# Patient Record
Sex: MALE | Race: WHITE
[De-identification: names, ages, dates, MRNs, and addresses within clinical notes are randomized per-mention and may not be internally consistent; named-entity substitution may affect disease eponyms.]

---

## 2017-11-01 ENCOUNTER — HOSPITAL ENCOUNTER (EMERGENCY)
Dept: HOSPITAL 84 - D.ER | Age: 77
Discharge: HOME | End: 2017-11-01
Payer: MEDICARE

## 2017-11-01 VITALS — BODY MASS INDEX: 22.1 KG/M2

## 2017-11-01 DIAGNOSIS — G89.29: ICD-10-CM

## 2017-11-01 DIAGNOSIS — R53.1: Primary | ICD-10-CM

## 2018-10-30 ENCOUNTER — HOSPITAL ENCOUNTER (INPATIENT)
Dept: HOSPITAL 84 - D.ER | Age: 78
LOS: 4 days | Discharge: HOME | DRG: 392 | End: 2018-11-03
Attending: FAMILY MEDICINE | Admitting: FAMILY MEDICINE
Payer: MEDICARE

## 2018-10-30 VITALS — SYSTOLIC BLOOD PRESSURE: 158 MMHG | DIASTOLIC BLOOD PRESSURE: 66 MMHG

## 2018-10-30 VITALS
HEIGHT: 68 IN | WEIGHT: 155 LBS | WEIGHT: 155 LBS | BODY MASS INDEX: 23.49 KG/M2 | BODY MASS INDEX: 23.49 KG/M2 | HEIGHT: 68 IN | BODY MASS INDEX: 23.49 KG/M2

## 2018-10-30 VITALS — SYSTOLIC BLOOD PRESSURE: 148 MMHG | DIASTOLIC BLOOD PRESSURE: 72 MMHG

## 2018-10-30 DIAGNOSIS — I10: ICD-10-CM

## 2018-10-30 DIAGNOSIS — E86.0: ICD-10-CM

## 2018-10-30 DIAGNOSIS — M25.522: ICD-10-CM

## 2018-10-30 DIAGNOSIS — E83.51: ICD-10-CM

## 2018-10-30 DIAGNOSIS — F41.9: ICD-10-CM

## 2018-10-30 DIAGNOSIS — K57.32: Primary | ICD-10-CM

## 2018-10-30 DIAGNOSIS — I71.4: ICD-10-CM

## 2018-10-30 DIAGNOSIS — R31.9: ICD-10-CM

## 2018-10-30 LAB
ALBUMIN SERPL-MCNC: 3.9 G/DL (ref 3.4–5)
ALP SERPL-CCNC: 95 U/L (ref 46–116)
ALT SERPL-CCNC: 16 U/L (ref 10–68)
AMYLASE SERPL-CCNC: 46 U/L (ref 25–115)
ANION GAP SERPL CALC-SCNC: 14.7 MMOL/L (ref 8–16)
APPEARANCE UR: CLEAR
BACTERIA #/AREA URNS HPF: (no result) /HPF
BASOPHILS NFR BLD AUTO: 0.3 % (ref 0–2)
BILIRUB SERPL-MCNC: 0.92 MG/DL (ref 0.2–1.3)
BILIRUB SERPL-MCNC: NEGATIVE MG/DL
BUN SERPL-MCNC: 21 MG/DL (ref 7–18)
CALCIUM SERPL-MCNC: 9.3 MG/DL (ref 8.5–10.1)
CHLORIDE SERPL-SCNC: 103 MMOL/L (ref 98–107)
CO2 SERPL-SCNC: 27.9 MMOL/L (ref 21–32)
COLOR UR: YELLOW
CREAT SERPL-MCNC: 1.1 MG/DL (ref 0.6–1.3)
EOSINOPHIL NFR BLD: 0.3 % (ref 0–7)
ERYTHROCYTE [DISTWIDTH] IN BLOOD BY AUTOMATED COUNT: 13.6 % (ref 11.5–14.5)
GLOBULIN SER-MCNC: 3.6 G/L
GLUCOSE SERPL-MCNC: 120 MG/DL (ref 74–106)
GLUCOSE SERPL-MCNC: NEGATIVE MG/DL
HCT VFR BLD CALC: 41.7 % (ref 42–54)
HGB BLD-MCNC: 13.7 G/DL (ref 13.5–17.5)
IMM GRANULOCYTES NFR BLD: 0.1 % (ref 0–5)
KETONES UR STRIP-MCNC: (no result) MG/DL
LIPASE SERPL-CCNC: 111 U/L (ref 73–393)
LYMPHOCYTES NFR BLD AUTO: 28.6 % (ref 15–50)
MCH RBC QN AUTO: 31.1 PG (ref 26–34)
MCHC RBC AUTO-ENTMCNC: 32.9 G/DL (ref 31–37)
MCV RBC: 94.6 FL (ref 80–100)
MONOCYTES NFR BLD: 7.2 % (ref 2–11)
NEUTROPHILS NFR BLD AUTO: 63.5 % (ref 40–80)
NITRITE UR-MCNC: NEGATIVE MG/ML
OSMOLALITY SERPL CALC.SUM OF ELEC: 286 MOSM/KG (ref 275–300)
PH UR STRIP: 5 [PH] (ref 5–6)
PLATELET # BLD: 267 10X3/UL (ref 130–400)
PMV BLD AUTO: 11.1 FL (ref 7.4–10.4)
POTASSIUM SERPL-SCNC: 3.6 MMOL/L (ref 3.5–5.1)
PROT SERPL-MCNC: 7.5 G/DL (ref 6.4–8.2)
PROT UR-MCNC: (no result) MG/DL
RBC # BLD AUTO: 4.41 10X6/UL (ref 4.2–6.1)
RBC #/AREA URNS HPF: (no result) /HPF (ref 0–5)
SODIUM SERPL-SCNC: 142 MMOL/L (ref 136–145)
SP GR UR STRIP: 1.02 (ref 1–1.02)
TROPONIN I SERPL-MCNC: < 0.017 NG/ML (ref 0–0.06)
UROBILINOGEN UR-MCNC: NORMAL MG/DL
WBC # BLD AUTO: 8.9 10X3/UL (ref 4.8–10.8)
WBC #/AREA URNS HPF: (no result) /HPF (ref 0–5)

## 2018-10-31 VITALS — DIASTOLIC BLOOD PRESSURE: 72 MMHG | SYSTOLIC BLOOD PRESSURE: 157 MMHG

## 2018-10-31 VITALS — DIASTOLIC BLOOD PRESSURE: 56 MMHG | SYSTOLIC BLOOD PRESSURE: 97 MMHG

## 2018-10-31 VITALS — SYSTOLIC BLOOD PRESSURE: 106 MMHG | DIASTOLIC BLOOD PRESSURE: 67 MMHG

## 2018-10-31 VITALS — DIASTOLIC BLOOD PRESSURE: 71 MMHG | SYSTOLIC BLOOD PRESSURE: 153 MMHG

## 2018-10-31 VITALS — DIASTOLIC BLOOD PRESSURE: 61 MMHG | SYSTOLIC BLOOD PRESSURE: 144 MMHG

## 2018-10-31 LAB
ALBUMIN SERPL-MCNC: 3.1 G/DL (ref 3.4–5)
ALP SERPL-CCNC: 73 U/L (ref 46–116)
ALT SERPL-CCNC: 13 U/L (ref 10–68)
ANION GAP SERPL CALC-SCNC: 13.4 MMOL/L (ref 8–16)
BASOPHILS NFR BLD AUTO: 0.8 % (ref 0–2)
BILIRUB SERPL-MCNC: 0.81 MG/DL (ref 0.2–1.3)
BUN SERPL-MCNC: 15 MG/DL (ref 7–18)
CALCIUM SERPL-MCNC: 8.2 MG/DL (ref 8.5–10.1)
CHLORIDE SERPL-SCNC: 106 MMOL/L (ref 98–107)
CK MB SERPL-MCNC: 5.3 U/L (ref 0–3.6)
CK MB SERPL-MCNC: 5.4 U/L (ref 0–3.6)
CK SERPL-CCNC: 107 UL (ref 21–232)
CK SERPL-CCNC: 125 UL (ref 21–232)
CO2 SERPL-SCNC: 26.3 MMOL/L (ref 21–32)
CREAT SERPL-MCNC: 0.9 MG/DL (ref 0.6–1.3)
EOSINOPHIL NFR BLD: 0.8 % (ref 0–7)
ERYTHROCYTE [DISTWIDTH] IN BLOOD BY AUTOMATED COUNT: 13.7 % (ref 11.5–14.5)
GLOBULIN SER-MCNC: 3.7 G/L
GLUCOSE SERPL-MCNC: 92 MG/DL (ref 74–106)
HCT VFR BLD CALC: 38.1 % (ref 42–54)
HGB BLD-MCNC: 12.4 G/DL (ref 13.5–17.5)
IMM GRANULOCYTES NFR BLD: 0.1 % (ref 0–5)
LYMPHOCYTES NFR BLD AUTO: 26.5 % (ref 15–50)
MAGNESIUM SERPL-MCNC: 2 MG/DL (ref 1.8–2.4)
MCH RBC QN AUTO: 30.8 PG (ref 26–34)
MCHC RBC AUTO-ENTMCNC: 32.5 G/DL (ref 31–37)
MCV RBC: 94.8 FL (ref 80–100)
MONOCYTES NFR BLD: 7.4 % (ref 2–11)
NEUTROPHILS NFR BLD AUTO: 64.4 % (ref 40–80)
OSMOLALITY SERPL CALC.SUM OF ELEC: 283 MOSM/KG (ref 275–300)
PLATELET # BLD: 246 10X3/UL (ref 130–400)
PMV BLD AUTO: 11.4 FL (ref 7.4–10.4)
POTASSIUM SERPL-SCNC: 3.7 MMOL/L (ref 3.5–5.1)
PROT SERPL-MCNC: 6.8 G/DL (ref 6.4–8.2)
RBC # BLD AUTO: 4.02 10X6/UL (ref 4.2–6.1)
SODIUM SERPL-SCNC: 142 MMOL/L (ref 136–145)
TROPONIN I SERPL-MCNC: < 0.017 NG/ML (ref 0–0.06)
TROPONIN I SERPL-MCNC: < 0.017 NG/ML (ref 0–0.06)
WBC # BLD AUTO: 7.5 10X3/UL (ref 4.8–10.8)

## 2018-11-01 VITALS — SYSTOLIC BLOOD PRESSURE: 149 MMHG | DIASTOLIC BLOOD PRESSURE: 76 MMHG

## 2018-11-01 VITALS — DIASTOLIC BLOOD PRESSURE: 78 MMHG | SYSTOLIC BLOOD PRESSURE: 168 MMHG

## 2018-11-01 VITALS — SYSTOLIC BLOOD PRESSURE: 154 MMHG | DIASTOLIC BLOOD PRESSURE: 68 MMHG

## 2018-11-01 VITALS — DIASTOLIC BLOOD PRESSURE: 74 MMHG | SYSTOLIC BLOOD PRESSURE: 156 MMHG

## 2018-11-01 LAB
ANION GAP SERPL CALC-SCNC: 10.3 MMOL/L (ref 8–16)
BASOPHILS NFR BLD AUTO: 0.4 % (ref 0–2)
BUN SERPL-MCNC: 11 MG/DL (ref 7–18)
CALCIUM SERPL-MCNC: 8.3 MG/DL (ref 8.5–10.1)
CHLORIDE SERPL-SCNC: 106 MMOL/L (ref 98–107)
CK MB SERPL-MCNC: 4.4 U/L (ref 0–3.6)
CK SERPL-CCNC: 94 UL (ref 21–232)
CO2 SERPL-SCNC: 28.3 MMOL/L (ref 21–32)
CREAT SERPL-MCNC: 0.9 MG/DL (ref 0.6–1.3)
EOSINOPHIL NFR BLD: 0.4 % (ref 0–7)
ERYTHROCYTE [DISTWIDTH] IN BLOOD BY AUTOMATED COUNT: 13.3 % (ref 11.5–14.5)
GLUCOSE SERPL-MCNC: 94 MG/DL (ref 74–106)
HCT VFR BLD CALC: 38.2 % (ref 42–54)
HGB BLD-MCNC: 12.5 G/DL (ref 13.5–17.5)
IMM GRANULOCYTES NFR BLD: 0.1 % (ref 0–5)
LYMPHOCYTES NFR BLD AUTO: 28.8 % (ref 15–50)
MAGNESIUM SERPL-MCNC: 1.9 MG/DL (ref 1.8–2.4)
MCH RBC QN AUTO: 30.6 PG (ref 26–34)
MCHC RBC AUTO-ENTMCNC: 32.7 G/DL (ref 31–37)
MCV RBC: 93.4 FL (ref 80–100)
MONOCYTES NFR BLD: 7.8 % (ref 2–11)
NEUTROPHILS NFR BLD AUTO: 62.5 % (ref 40–80)
OSMOLALITY SERPL CALC.SUM OF ELEC: 279 MOSM/KG (ref 275–300)
PLATELET # BLD: 224 10X3/UL (ref 130–400)
PMV BLD AUTO: 10.9 FL (ref 7.4–10.4)
POTASSIUM SERPL-SCNC: 3.6 MMOL/L (ref 3.5–5.1)
RBC # BLD AUTO: 4.09 10X6/UL (ref 4.2–6.1)
SODIUM SERPL-SCNC: 141 MMOL/L (ref 136–145)
TROPONIN I SERPL-MCNC: < 0.017 NG/ML (ref 0–0.06)
WBC # BLD AUTO: 6.7 10X3/UL (ref 4.8–10.8)

## 2018-11-02 VITALS — DIASTOLIC BLOOD PRESSURE: 96 MMHG | SYSTOLIC BLOOD PRESSURE: 162 MMHG

## 2018-11-02 VITALS — DIASTOLIC BLOOD PRESSURE: 84 MMHG | SYSTOLIC BLOOD PRESSURE: 157 MMHG

## 2018-11-02 VITALS — DIASTOLIC BLOOD PRESSURE: 65 MMHG | SYSTOLIC BLOOD PRESSURE: 147 MMHG

## 2018-11-02 LAB
ALBUMIN SERPL-MCNC: 3.1 G/DL (ref 3.4–5)
ALP SERPL-CCNC: 69 U/L (ref 46–116)
ALT SERPL-CCNC: 14 U/L (ref 10–68)
ANION GAP SERPL CALC-SCNC: 12.7 MMOL/L (ref 8–16)
BASOPHILS NFR BLD AUTO: 0.7 % (ref 0–2)
BILIRUB SERPL-MCNC: 0.63 MG/DL (ref 0.2–1.3)
BUN SERPL-MCNC: 11 MG/DL (ref 7–18)
CALCIUM SERPL-MCNC: 8.4 MG/DL (ref 8.5–10.1)
CHLORIDE SERPL-SCNC: 107 MMOL/L (ref 98–107)
CO2 SERPL-SCNC: 26 MMOL/L (ref 21–32)
CREAT SERPL-MCNC: 0.9 MG/DL (ref 0.6–1.3)
EOSINOPHIL NFR BLD: 1.7 % (ref 0–7)
ERYTHROCYTE [DISTWIDTH] IN BLOOD BY AUTOMATED COUNT: 13.4 % (ref 11.5–14.5)
GLOBULIN SER-MCNC: 3 G/L
GLUCOSE SERPL-MCNC: 98 MG/DL (ref 74–106)
HCT VFR BLD CALC: 36.7 % (ref 42–54)
HGB BLD-MCNC: 12.2 G/DL (ref 13.5–17.5)
IMM GRANULOCYTES NFR BLD: 0.2 % (ref 0–5)
LYMPHOCYTES NFR BLD AUTO: 36.1 % (ref 15–50)
MAGNESIUM SERPL-MCNC: 1.8 MG/DL (ref 1.8–2.4)
MCH RBC QN AUTO: 31 PG (ref 26–34)
MCHC RBC AUTO-ENTMCNC: 33.2 G/DL (ref 31–37)
MCV RBC: 93.4 FL (ref 80–100)
MONOCYTES NFR BLD: 10.3 % (ref 2–11)
NEUTROPHILS NFR BLD AUTO: 51 % (ref 40–80)
OSMOLALITY SERPL CALC.SUM OF ELEC: 281 MOSM/KG (ref 275–300)
PLATELET # BLD: 226 10X3/UL (ref 130–400)
PMV BLD AUTO: 11 FL (ref 7.4–10.4)
POTASSIUM SERPL-SCNC: 3.7 MMOL/L (ref 3.5–5.1)
PROT SERPL-MCNC: 6.1 G/DL (ref 6.4–8.2)
RBC # BLD AUTO: 3.93 10X6/UL (ref 4.2–6.1)
SODIUM SERPL-SCNC: 142 MMOL/L (ref 136–145)
WBC # BLD AUTO: 5 10X3/UL (ref 4.8–10.8)

## 2018-11-03 VITALS — SYSTOLIC BLOOD PRESSURE: 179 MMHG | DIASTOLIC BLOOD PRESSURE: 92 MMHG

## 2018-11-03 VITALS — DIASTOLIC BLOOD PRESSURE: 72 MMHG | SYSTOLIC BLOOD PRESSURE: 145 MMHG

## 2018-11-03 VITALS — DIASTOLIC BLOOD PRESSURE: 79 MMHG | SYSTOLIC BLOOD PRESSURE: 162 MMHG

## 2018-11-03 LAB
ALBUMIN SERPL-MCNC: 3.1 G/DL (ref 3.4–5)
ALP SERPL-CCNC: 64 U/L (ref 46–116)
ALT SERPL-CCNC: 15 U/L (ref 10–68)
ANION GAP SERPL CALC-SCNC: 14.9 MMOL/L (ref 8–16)
BASOPHILS NFR BLD AUTO: 0.8 % (ref 0–2)
BILIRUB SERPL-MCNC: 0.4 MG/DL (ref 0.2–1.3)
BUN SERPL-MCNC: 12 MG/DL (ref 7–18)
CALCIUM SERPL-MCNC: 8.8 MG/DL (ref 8.5–10.1)
CHLORIDE SERPL-SCNC: 107 MMOL/L (ref 98–107)
CO2 SERPL-SCNC: 24.3 MMOL/L (ref 21–32)
CREAT SERPL-MCNC: 0.8 MG/DL (ref 0.6–1.3)
EOSINOPHIL NFR BLD: 1.8 % (ref 0–7)
ERYTHROCYTE [DISTWIDTH] IN BLOOD BY AUTOMATED COUNT: 13.1 % (ref 11.5–14.5)
GLOBULIN SER-MCNC: 3.4 G/L
GLUCOSE SERPL-MCNC: 112 MG/DL (ref 74–106)
HCT VFR BLD CALC: 37.1 % (ref 42–54)
HGB BLD-MCNC: 12.3 G/DL (ref 13.5–17.5)
IMM GRANULOCYTES NFR BLD: 0 % (ref 0–5)
LYMPHOCYTES NFR BLD AUTO: 33.2 % (ref 15–50)
MAGNESIUM SERPL-MCNC: 2 MG/DL (ref 1.8–2.4)
MCH RBC QN AUTO: 30.5 PG (ref 26–34)
MCHC RBC AUTO-ENTMCNC: 33.2 G/DL (ref 31–37)
MCV RBC: 92.1 FL (ref 80–100)
MONOCYTES NFR BLD: 10.4 % (ref 2–11)
NEUTROPHILS NFR BLD AUTO: 53.8 % (ref 40–80)
OSMOLALITY SERPL CALC.SUM OF ELEC: 285 MOSM/KG (ref 275–300)
PLATELET # BLD: 257 10X3/UL (ref 130–400)
PMV BLD AUTO: 11.3 FL (ref 7.4–10.4)
POTASSIUM SERPL-SCNC: 3.2 MMOL/L (ref 3.5–5.1)
PROT SERPL-MCNC: 6.5 G/DL (ref 6.4–8.2)
RBC # BLD AUTO: 4.03 10X6/UL (ref 4.2–6.1)
SODIUM SERPL-SCNC: 143 MMOL/L (ref 136–145)
WBC # BLD AUTO: 6.2 10X3/UL (ref 4.8–10.8)

## 2019-08-07 ENCOUNTER — HOSPITAL ENCOUNTER (OUTPATIENT)
Dept: HOSPITAL 84 - D.ER | Age: 79
Setting detail: OBSERVATION
LOS: 1 days | Discharge: HOME | End: 2019-08-08
Attending: INTERNAL MEDICINE | Admitting: INTERNAL MEDICINE
Payer: MEDICARE

## 2019-08-07 VITALS — DIASTOLIC BLOOD PRESSURE: 72 MMHG | SYSTOLIC BLOOD PRESSURE: 128 MMHG

## 2019-08-07 VITALS — BODY MASS INDEX: 25.82 KG/M2 | HEIGHT: 68 IN | WEIGHT: 170.36 LBS

## 2019-08-07 VITALS — DIASTOLIC BLOOD PRESSURE: 74 MMHG | SYSTOLIC BLOOD PRESSURE: 127 MMHG

## 2019-08-07 VITALS — SYSTOLIC BLOOD PRESSURE: 129 MMHG | DIASTOLIC BLOOD PRESSURE: 72 MMHG

## 2019-08-07 VITALS — DIASTOLIC BLOOD PRESSURE: 76 MMHG | SYSTOLIC BLOOD PRESSURE: 118 MMHG

## 2019-08-07 DIAGNOSIS — I25.110: Primary | ICD-10-CM

## 2019-08-07 DIAGNOSIS — I10: ICD-10-CM

## 2019-08-07 LAB
ALBUMIN SERPL-MCNC: 3.9 G/DL (ref 3.4–5)
ALP SERPL-CCNC: 95 U/L (ref 46–116)
ALT SERPL-CCNC: 16 U/L (ref 10–68)
ANION GAP SERPL CALC-SCNC: 13.8 MMOL/L (ref 8–16)
APTT BLD: 29.1 SECONDS (ref 22.8–39.4)
BASOPHILS NFR BLD AUTO: 0.4 % (ref 0–2)
BILIRUB SERPL-MCNC: 0.74 MG/DL (ref 0.2–1.3)
BUN SERPL-MCNC: 17 MG/DL (ref 7–18)
CALCIUM SERPL-MCNC: 8.8 MG/DL (ref 8.5–10.1)
CHLORIDE SERPL-SCNC: 105 MMOL/L (ref 98–107)
CK MB SERPL-MCNC: 0.5 U/L (ref 0–3.6)
CK MB SERPL-MCNC: 0.6 U/L (ref 0–3.6)
CK MB SERPL-MCNC: 0.6 U/L (ref 0–3.6)
CK SERPL-CCNC: 26 UL (ref 21–232)
CK SERPL-CCNC: 28 UL (ref 21–232)
CK SERPL-CCNC: 43 UL (ref 21–232)
CO2 SERPL-SCNC: 25.4 MMOL/L (ref 21–32)
CREAT SERPL-MCNC: 1.2 MG/DL (ref 0.6–1.3)
EOSINOPHIL NFR BLD: 1.1 % (ref 0–7)
ERYTHROCYTE [DISTWIDTH] IN BLOOD BY AUTOMATED COUNT: 13.6 % (ref 11.5–14.5)
GLOBULIN SER-MCNC: 3.9 G/L
GLUCOSE SERPL-MCNC: 150 MG/DL (ref 74–106)
HCT VFR BLD CALC: 40.5 % (ref 42–54)
HGB BLD-MCNC: 13.6 G/DL (ref 13.5–17.5)
IMM GRANULOCYTES NFR BLD: 0.1 % (ref 0–5)
INR PPP: 1.04 (ref 0.85–1.17)
LYMPHOCYTES NFR BLD AUTO: 23.6 % (ref 15–50)
MAGNESIUM SERPL-MCNC: 2.3 MG/DL (ref 1.8–2.4)
MCH RBC QN AUTO: 31.2 PG (ref 26–34)
MCHC RBC AUTO-ENTMCNC: 33.6 G/DL (ref 31–37)
MCV RBC: 92.9 FL (ref 80–100)
MONOCYTES NFR BLD: 6.3 % (ref 2–11)
NEUTROPHILS NFR BLD AUTO: 68.5 % (ref 40–80)
OSMOLALITY SERPL CALC.SUM OF ELEC: 283 MOSM/KG (ref 275–300)
PLATELET # BLD: 306 10X3/UL (ref 130–400)
PMV BLD AUTO: 11.2 FL (ref 7.4–10.4)
POTASSIUM SERPL-SCNC: 4.2 MMOL/L (ref 3.5–5.1)
PROT SERPL-MCNC: 7.8 G/DL (ref 6.4–8.2)
PROTHROMBIN TIME: 13.1 SECONDS (ref 11.6–15)
RBC # BLD AUTO: 4.36 10X6/UL (ref 4.2–6.1)
SODIUM SERPL-SCNC: 140 MMOL/L (ref 136–145)
TROPONIN I SERPL-MCNC: < 0.017 NG/ML (ref 0–0.06)
WBC # BLD AUTO: 9.5 10X3/UL (ref 4.8–10.8)

## 2019-08-07 NOTE — NUR
REPORT RECEIVED, WILL CPOC. PATIENT IS A&OX4, UP AD EDILBERTO. HE'S LYING ON RT
SIDE, EYES CLOSED. PATIENT WILL HAVE CATH IN AM, INFORMED PATIENT HE'D BE NPO
AT MIDNIGHT. NO S/SX OF DISTRESS NOTED, RR EVEN AND UNLABORED, SR ON MONITORS.
IV TO RT AC VÍCTOR, LAY C/D/I, PATENT. PATIENT DENIES FURTHER NEEDS. CL IN REACH,
BED LOCKED AND LOWERED. WILL CTM.

## 2019-08-07 NOTE — NUR
PATIENT REQUESTED PAIN MEDS, CALLED DR. MILLS. ORDER FOR ONE TIME 5MG NORCO
PUT IN. ADMINISTERED MED TO PATIENT. WILL CTM.

## 2019-08-07 NOTE — HEMODYNAMI
PATIENT:NOEMI AUGUSTIN                                     MEDICAL RECORD: [id number]
: 42                                            LOCATION:[facility]     D.2122
[facility]# [id number]                                       ADMISSION DATE: 19
 
 
 Generatedon:201911:13
Patient name: NOEMI AUGUSTIN Patient #: [id number] Visit #: [id number] SSN: [id number]
 :
1940 Date of study: 2019
Page: Of
Hemodynamic Procedure Report
****************************
Patient Data
Patient Demographics
Procedure consent was obtained
First Name: NOEMI           Gender: Male
Last Name: CHARLI           : 1940
Patient #: [id number]       Age: 79 year(s)
Visit #: [id number]       Race: 
SSN: 
Accession #:
[id number]
Additional ID: [id number]
Contact details
Address: [address] [place] [address]      Phone: [phone]
State: [place]
City: [place]
Zip code: [address]
Past Medical History
Allergies: No known allergies
Admission
Admission Data
Admission Date: 2019    Admission Time: 16:09
Arrival Date: 2019      Arrival Time: 0:00
Admit Source: Other         Insurance Payor: Private
Room #: D.2122              health insurance
[facility] #: [id number]
Height (in.): 67.72         BSA: 1.85 (m2)
Height (cm.): 172           BMI: 24.34 (kg/m2)
Weight (lbs.): 158.73
Weight (kg.): 72
Procedure
Procedure Types
Cath Procedure
Diagnostic Procedure
C
[facility] w/Coronaries
Procedure Description
Procedure Date
Procedure Date: 2019
Procedure Start Time: 10:58
Procedure End Time: 11:13
Procedure Staff
Name                            Function
Star Valladares MD                   Performing Physician
Shyam Rome RT                  Monitor
Nereyda Marrero RT                Scrub
Micky Lazcano RT                  Scrub
Leigha Quezada RN                  Nurse
 
Procedure Data
Cath Procedure
Fluoroscopy
Diagnostic fluoroscopy      Total fluoroscopy Time: 2.2
time: 2.2 min               min
Diagnostic fluoroscopy      Total fluoroscopy dose: 313
dose: 313 mGy               mGy
Contrast Material
Contrast Material Type                       Amount (ml)
Isovue 300                                   78
Entry Location
Entry     Primary  Successful  Side  Size  Upsize Upsize Entry    Closure Succes
sful  Closure
Location                             (Fr)  1 (Fr) 2 (Fr) Remarks  Device        
      Remarks
Femoral                        Right 5 Fr                         Exoseal
artery
Estimated blood loss: 5 ml
Diagnostic catheters
Device Type               Used For           End Catheter
Placement
MULTIPACK JL 4.0 5Fr      Procedure
catheter
MULTIPACK 3DRC 5Fr        Procedure
catheter
MULTIPACK Pigtail 5 Fr    Procedure
catheter
Procedure Complications
No complications
Procedure Medications
Medication           Administration Route Dosage
Oxygen               etCO2 Nasal cannula  2 l/min
Lidocaine 2%         added to field       20
Heparin Flush Bag    added to field       2 bags
(1000units/500ml NS)
0.9% NaCl            I.V.                 100 ml/hr
Versed               I.V.                 2 mg
Fentanyl             I.V.                 50 mcg
Versed               I.V.                 1 mg
Fentanyl             I.V.                 50 mcg
Versed               I.V.                 1 mg
Hemodynamics
Rest
BSA: 1.85 (m2) O2 Consumption: Estimated: 212.83 (ml/min) O2 Consumption indexed
:
Estimated:115.04 (ml/min/m) Heart Rate: 71 (bpm)
Pressure Samples
Time     Site     Value (mmHg) Purpose      Heart      Use
Rate(bpm)
11:08    LV       102/-8,4     Snapshot     71
11:08    AO       104/50(73)   Pullback     71
11:08    LV       96/-4,12     Pullback     71
Gradients
Valve  Time  Site 1   Site 2     Mean    SEP/DFP    Peak To Heart  Use
(mmHg)  (sec/min)  Peak    Rate
(mmHg)  (bpm)
Aortic 11:08 LV       AO         0       4          0       71
96/-4,12 104/50(73)
Calculations
Valve    P-P      Mean      Valve     Index  Valve    Source
 
Name              Gradient  Area             Flow
(cm2)
Aortic   0        0
0        0
Snapshots
Pre Cath      Intra         NCS           Post Cath
Vital Signs
Time     Heart  Resp   SPO2 etCO2   NIBP (mmHg) Rhythm  Pain      Sedation
Rate   (ipm)  (%)  (mmHg)                      Status    Level
(bpm)
10:43:24 74     18     98   0       138/81(112) NSR     3 (11) ,  10(A)
Tolerable
10:47:36 73     13     95   32.2    117/77(104) NSR     3 (11) ,  10(A)
Tolerable
10:51:42 73     17     96   34.4    126/72(110) NSR     3 (11) ,  10(A)
Tolerable
10:55:52 71     15     95   16.4    116/73(99)  NSR     0 (11) ,  9(A)
No pain
10:59:59 70     15     97   33.7    105/67(81)  NSR     0 (11) ,  9(A)
No pain
11:04:03 73     13     97   14.9    115/68(88)  NSR     0 (11) ,  9(A)
No pain
11:08:13 74     17     97   35.9    95/60(82)   NSR     0 (11) ,  9(A)
No pain
11:13:00 70     14     97   37.4    119/69(100) NSR     0 (11) ,  10(A)
No pain
Medications
Time     Medication       Route   Dose  Verified Delivered Reason     Notes  Eff
ectiveness
by       by
10:46:38 Oxygen           etCO2   2     Star     Buffie    used for
Nasal   l/min Blaine Quzeada RN   procedure
cannula
10:46:45 Lidocaine 2%     added   20ml  Star     Star      for local
to      vial  Blaine Valladares MD  anesthetic
field
10:46:51 Heparin Flush    added   2     Star     Star      used for
Bag              to      bags  Blaine Valladares MD  procedure
(1000units/500ml field
NS)
10:46:59 0.9% NaCl        I.V.    100   Star     Buffie    Per
ml/hr Blaine Quezada RN   physician
10:52:31 Versed           I.V.    2 mg  Star     Buffie    for
Blaine Quezada RN   sedation
10:52:37 Fentanyl         I.V.    50    Star     Buffie    for
mcg   Blaine Quezada RN   sedation
10:58:20 Versed           I.V.    1 mg  Star     Buffie    for
Blaine Quezada RN   sedation
10:58:24 Fentanyl         I.V.    50    Star     Buffie    for
mcg   Blaine Quezada RN   sedation
11:02:27 Versed           I.V.    1 mg  Star     Buffie    for
Blaine Quezada RN   sedation
Procedure Log
Time     Note
9:59:48  Informed consent obtained and on chart
10:00:17 Insurance Payor : Private health insurance
10:00:34 Admit Source: Other
10:00:36 Arrival Date: 2019 12:00:00 AM
10:00:50 Patient Weight : 158.73 lbs
10:00:53 Patient Height : 67.72 inches
 
10:01:26 Lab Result : BUN 17 mg/dl
10:01:26 Lab Result : Hematocrit 40.5 %
10:01:26 Lab Result : Hemoglobin 13.6 g/dl
10:01:26 Lab Result : Creatinine 1.2 mg/dl
10:01:46 Diagnostic Cath Status : Elective
10:05:08 Patient allergic to No known allergies
10:06:14 **ACC** Patient presents with Unstable Angina CCS
Anginal Class 4--Inability to carry out any physical
activity w/o angina. Angina may occur at rest.
10:06:17 **ACC**Patient has been prescribed/administered the
following anti-anginal medication within the last 2
weeks: Calcium Channel Blockers
10:06:20 Procedure Status Urgent Heart Cath (IP).
10:06:22 Time tracking: Regular hours (M-F 7:00 - 5:00)
10:06:25 Plan of Care:Hemodynamics will remain stable., Cardiac
rhythm will remain stable., Comfort level will be
maintained., Respiratory function will remain
adequate., Patient/ family verbilizes understanding of
procedure., Procedure tolerated without complication.,
Recovers from procedure without complications..
10:06:36 H&P Date Dictated: 2019 Within 30 days and on
chart..
10:25:34 Shyam Rome RT(R) sent for patient. Start room use.
10:35:45 Patient received from Aditive II to CCL 1 Alert and
oriented. Tansferred to table in Supine position.
10:35:47 Warm blankets applied, and sandor hugger turned on for
patient comfort.
10:35:48 Correct patient and procedure confirmed by team.
10:35:48 ECG and BP/O2 sat monitors applied to patient.
10:35:50 Pre-procedure instructions explained to patient.
10:35:53 Pre-op teaching completed and patient verbalized
understanding.
10:35:57 Family unavailable.
10:35:57 Patient NPO since Midnight.
10:35:59 Is the patient allergic to Iodine/contrast media? No.
10:36:00 Is patient on blood thinner?No
10:36:01 Patient diabetic? No.
10:36:03 Previous problem with sedation/anesthesia? No ?
10:36:05 Snore? No
10:36:06 Sleep apnea? No
10:36:07 Deviated septum? No
10:36:08 Opens mouth fully? Yes
10:36:09 Sticks out tongue? Yes
10:36:10 Airway obstruction? No ?
10:36:12 Dentures? No ?
10:41:51 Pre procedure: right dorsailis pedis pulse 1+
Palpable, but thready & weak; easily obliterated
10:41:56 Patient pain scale 3/10 chest .
10:42:04 IV patent on arrival in right forearm with 0.9% NaCl
at [facility].
10:42:05 Lab results completed and on chart.
10:42:08 Right groin area was prepped with chlora-prep and
draped in sterile fashion
10:42:09 Alarms reviewed by R. N.
10:42:09 Sharps counted by scrub and verified by R.N.
10:42:11 Use device set Femoral Dx
10:42:12 ACIST Syringe (85670) opened to sterile field.
10:42:12 Bag Decanter (2002S) opened to sterile field.
10:42:13 Medline Cath Pack ([id number]) opened to sterile field.
10:42:13 ACIST Hand Control (18021) opened to sterile field.
 
10:42:14 ACIST Manifold (45394) opened to sterile field.
10:42:15 Tegaderm 4 x 4 (1626W) opened to sterile field.
10:42:15 DIAGNOSTIC Multipack 5Fr catheter set ([id number]) opened
to sterile field.
10:42:16 EMERALD Guide Wire (672-208) opened to sterile field.
10:42:17 SHEATH 5FR Matlock (IBW263) opened to sterile field.
10:42:23 Vital chart was started
10:42:24 Baseline sample Acquired.
10:42:26 Rhythm: sinus rhythm
10:42:27 Full Disclosure recording started
10:46:38 Oxygen 2 l/min etCO2 Nasal cannula was administered by
Leigha Quezada RN; used for procedure;
10:46:45 Lidocaine 2% 20ml vial added to field was administered
by Star Valladares MD; for local anesthetic;
10:46:51 Heparin Flush Bag (1000units/500ml NS) 2 bags added to
field was administered by Star Valladares MD; used for
procedure;
10:46:59 0.9% NaCl 100 ml/hr I.V. was administered by Leigha Quezada RN; Per physician;
10:49:22 Physician arrived
10:49:22 --------ALL STOP TIME OUT------
10:49:23 Final Timeout: patient, procedure, and site verified
with staff and physician. All members of the team are
in agreement.
10:49:26 Right groin site verified by team.
10:49:29 Fire Safety Assessment: A--An alcohol-based skin
anteseptic being used preoperatively., C--Open oxygen
or nitrous oxide is being used., D--An ESU, laser, or
fiber-optic light is being used.
10:49:32 Physical assessment completed. ASA score P 2 - A
patient with mild systemic disease as per Star Valladares MD.
10:51:41 2) 60-89 Mildly reduced kidney function, and other
findings (as for stage 1) point to kidney disease.
10:51:45 Maximum allowable contrast dose (3.7 X eGFR X 0.75)172
ml.
10:51:48 Sedation plan: IV Moderate Sedation Medication:Versed,
Fentanyl
10:52:31 Versed 2 mg I.V. was administered by Leigha Quezada RN;
for sedation;
10:52:37 Fentanyl 50 mcg I.V. was administered by Leigha Quezada
RN; for sedation;
10:54:45 Zero performed for pressure channel P1
10:58:20 Versed 1 mg I.V. was administered by Leigha Quezada RN;
for sedation;
10:58:24 Procedure started.
10:58:24 Fentanyl 50 mcg I.V. was administered by Leigha Quezada
RN; for sedation;
10:58:27 Local anesthetic to right femoral artery with
Lidocaine 2% by Star Valladares MD.**INITIAL ACCESS ONLY**
10:58:34 A 5 Fr sheath was inserted into the Right Femoral
artery
11:00:02 A MULTIPACK JL 4.0 5Fr catheter was advanced over the
wire and used for Procedure.
11:00:46 LCA angiography performed.
11:02:11 Catheter exchanged over wire.
11:02:16 A MULTIPACK 3DRC 5Fr catheter was advanced over the
wire and used for Procedure.
11:02:27 Versed 1 mg I.V. was administered by Leigha Quezada RN;
for sedation;
 
11:04:06 RCA angiography performed.
11:05:29 Catheter exchanged over wire.
11:05:34 A MULTIPACK Pigtail 5 Fr catheter was advanced over
the wire and used for Procedure.
11:08:09 LV gram done using MCKEON
11:08:12 Injector settings: Ml/sec: 10, Volume: 20,
11:08:13 LV hemodynamics recorded.
11:08:24 EF : 60 %
11:08:38 Catheter removed.
11:08:39 EXOSEAL 5Fr () opened to sterile field.
11:09:16 Sheath removed intact; hemostasis achieved with
Exoseal to the Right Femoral artery.
11:11:27 Procedure ended.(Physican Out)
11:11:37 Fluoroscopy time 02.20 minutes.
11:11:41 Flurop Dose total: 313
11:11:41 Fluoroscopy dose: 313 mGy
11:11:48 Dose Area Product 80461 mGy/cm.
11:12:03 Contrast amount:Isovue 300 78ml.
11:12:06 Maximum allowable dose exceeded? No.
11:12:20 Sharps counted by scrub and verified by R.N.
11:12:21 Insertion/operative site no bleeding no hematoma.
11:12:23 Post-op/insertion site Right Femoral artery dressed
using a 4 x 4 and Tegaderm.
11:12:26 Post right femoral artery:stable, soft, clean and dry
11:12:27 Post Procedure Pulses reassessed and unchanged
11:12:29 Post-procedure physical assessment completed. ASA
score P 2 - A patient with mild systemic disease as
per Star Valladares MD.
11:12:31 Post procedure rhythm: unchanged.
11:12:33 Estimated blood loss: 5 ml
11:12:34 Post procedure instruction explained to
patient.Patient verbalizes understanding.
11:12:35 Patient needs reinforcement of post procedure
teaching.
11:13:09 Procedure and supply charges have been captured,
reviewed, submitted and are correct.
11:13:11 Procedure Complication : No complications
11:13:13 Vital chart was stopped
11:13:14 See physician's report for complete and final results.
11:13:15 Report given to PCU.
11:13:18 Patient transfered to PCU with Stretcher.
11:13:20 Procedure ended.
11:13:20 Full Disclosure recording stopped
11:13:26 End room use (Document Last)
Device Usage
Item Name   Manufacture  Quantity  Catalog    Hospital Part    Current Minimal L
ot# /
Number     Charge   Number  Stock   Stock   Serial#
Code
ACIST       Acist        1         96690      102098   200588  205788  20
[employer]ari Medical
(79145)     Systems Inc
Bag         Microtek     1               430093   52567   370525  5
Decanter    Medical Inc.
(2002S)
Medline     Medline      1         [id number]  153155   75333   582127  5
Cath Pack
([id number])
ACIST Hand  Acist        1         82586      176929   606483  935963  5
Control     Medical
 
(92279)     Systems Inc
ACIST       Acist        1         22032      694098   221787  173212  5
Manifold    Medical
(11187)     Systems Inc
Tegaderm 4  3M           1         1626W      548246   717575  375312  5
x 4 (1626W)
DIAGNOSTIC  Cardinal     1         XJ5433     676077   10381   829401  30
Multipack   Health
5Fr
catheter
set
(YF0293)
EMERALD     Cardinal     1         780-617    366552   979009  383429  5
Guide Wire  Health
(502-390)
SHEATH 5FR  Terumo       1         HPU577     837275   586925  286519  5
Matlock
(PGR586)
MULTIPACK   Cardinal     1                                     862326  5
JL 4.0 5Fr  Health
catheter
MULTIPACK   Cardinal     1                                     749054  5
3DRC 5Fr    Health
catheter
MULTIPACK   Cardinal     1                                     771888  5
Pigtail 5   Health
Fr catheter
EXOSEAL 5Fr Cardinal     1               158087   407097  121101  10
()     Health
Signature Audit Kent
Stage           Time        Signature      Unsigned
Intra-Procedure 2019    Shyam Rome
11:13:53 AM RT(R)
Signatures
Performing Physician :  Signature :
Star Valladares MD           ______________________________
Date : ______________ Time :
______________
Monitor : Shyam Rome RT Signature :
______________________________
Date : ______________ Time :
______________
Nurse : Leigha Quezada RN   Signature :
______________________________
Date : ______________ Time :
______________
 
 
 
 
 
 
 
 
 
[facility]                                          
[address]                           
[place] [address]

## 2019-08-08 VITALS — DIASTOLIC BLOOD PRESSURE: 59 MMHG | SYSTOLIC BLOOD PRESSURE: 117 MMHG

## 2019-08-08 VITALS — SYSTOLIC BLOOD PRESSURE: 125 MMHG | DIASTOLIC BLOOD PRESSURE: 66 MMHG

## 2019-08-08 VITALS — DIASTOLIC BLOOD PRESSURE: 52 MMHG | SYSTOLIC BLOOD PRESSURE: 113 MMHG

## 2019-08-08 LAB
CK MB SERPL-MCNC: 0.6 U/L (ref 0–3.6)
CK SERPL-CCNC: 25 UL (ref 21–232)
TROPONIN I SERPL-MCNC: < 0.017 NG/ML (ref 0–0.06)

## 2019-08-08 NOTE — MORECARE
CASE MANAGEMENT DISCHARGE SUMMARY
 
 
PATIENT: NOEMI AUGUSTIN                        UNIT: [id number]
ACCOUNT#: [id number]                       ADM DATE: 19
AGE: 79     : [birth year]  SEX: M            ROOM/BED: D.2122    
AUTHOR: YADIRA ROCHE                             PHYSICIAN:                               
 
REFERRING PHYSICIAN: TYLER AYERS M.D.          
DATE OF SERVICE: 19
Discharge Plan
 
 
Patient Name: NOEMI AUGUSTIN
Facility: [facility]:[place]
Encounter #: [id number]
Medical Record #: [id number]
: 1940
Planned Disposition: Home
Anticipated Discharge Date: 19
 
Discharge Date: 
Expected LOS: 1
Initial Reviewer: [email or web address]
Initial Review Date: 2019
Generated: 19   4:03 pm 
 DCPIA - Discharge Planning Initial Assessment
 
Updated by [email or web address]: Gerald Patterson on 19   3:00 pm
*  Is the patient Alert and Oriented?
Yes
*  How many steps to enter\exit or inside your home?
 
*  PCP
DR. ALFORD
*  Pharmacy
HOME [facility]
*  Preadmission Environment
Home Alone
*  ADLs
Independent
*  Equipment
None
*  Other Equipment
NO MEDICAL EQUIPMENT PROVIDER PREFERENCE
*  List name and contact numbers for known caregivers / representatives who 
currently or will assist patient after discharge:
NONE
*  Verbal permission to speak to the caregivers and representatives has been 
obtained from the patient.
 
N/A
*  Community resources currently utilized
None
*  Please name any agencies selected above.
NONE
*  Additional services required to return to the preadmission environment?
No
*  Can the patient safely return to the preadmission environment?
Yes
*  Has this patient been hospitalized within the prior 30 days at any 
hospital?
No
 
 
 
 
 
 
Patient Name: NOEMI AUGUSTIN
Encounter #: [id number]
Page 10063
 
 
 
 
 
Electronically Signed by YADIRA ROCHE on 19 at 1503
 
 
 
 
 
 
**All edits/amendments must be made on the electronic document**
 
DICTATION DATE: 19 1503     : JARRELL  19 1503     
RPT#: [id number]                                DC DATE:        
                                               STATUS: ADM IN  
[facility]
 [place] [address]
***END OF REPORT***

## 2019-08-08 NOTE — NUR
REPORT RECEIVED. WILL CONTINUE WITH POC. PT CURRENTLY LYING ON RIGHT SIDE
RESTING. CALL LIGHT W/I REACH. PT IS AAO AND UP AD EDILBERTO. RR EVEN AND UNLABORED
ON RA. PIV IS SALINE LOCKED. PT IS NPO FOR HEART CATH. PT DENIES ANY NEEDS AT
THIS TIME. NO S/S OF DISTRESS NOTED. WILL CTM.

## 2019-08-08 NOTE — NUR
PREOP MEDS ADMINISTERED PER CATH LAB REQUEST. NS INFUSING @50ML/HR VIA R.AC
PIV. PT DENIES ANY NEEDS. WILL CTM.

## 2019-08-08 NOTE — NUR
PT RETURNED FROM CATH LAB. RIGHT FEMORAL CATH SITE IS C/D/I WITH NO S/S OF
HEMATOMA PRESENT. NO S/S OF DISTRESS NOTED. PT DENIES ANY NEEDS. WILL CTM.

## 2019-08-08 NOTE — MORECARE
CASE MANAGEMENT DISCHARGE SUMMARY
 
 
PATIENT: NOEMI AUGUSTIN                        UNIT: [id number]
ACCOUNT#: [id number]                       ADM DATE: 19
AGE: 79     : 42  SEX: M            ROOM/BED: D.0322    
AUTHOR: MELCHOR,DOC                             PHYSICIAN:                               
 
REFERRING PHYSICIAN: TYLER AYERS M.D.          
DATE OF SERVICE: 19
Discharge Plan
 
 
Patient Name: NOEMI AUGUSTIN
Facility: [facility]:[place]
Encounter #: [id number]
Medical Record #: [id number]
: 1940
Planned Disposition: Home
Anticipated Discharge Date: 19
 
Discharge Date: 2019
Expected LOS: 1
Initial Reviewer: [email or web address]
Initial Review Date: 2019
Generated: 19   4:12 pm 
Comments
 
DCP- Discharge Planning
 
Updated by [id number]: Gerald Patterson on 19   2:06 pm CT
Patient Name: NOEMI AUGUSTIN                                     
Admission Status: ER   
Accout number: [id number]                              
Admission Date: 2019   
: 1940                                                        
Admission Diagnosis:   
Attending: TYLER AYERS                                                
Current LOS:  1   
  
Anticipated DC Date: 2019   
Planned Disposition: Home   
Primary Insurance: MEDICARE A & B   
  
  
Discharge Planning Comments:   
CM SPOKE TO PT'S BEDSIDE NURSE WHO INFORMED CM THAT PT DROVE HIMSELF TO 
EMERGENCY ROOM, CANNOT DRIVE FOR 24 HOURS AND HAS NO RIDE HOME.  CM MET WITH 
PT IN ROOM TO DISCUSS DISCHARGE PLANNING AND NEEDS. PT REPORTS LIVING AT HOME 
INDEPENDENTLY AND ALONE. PT HAS NO MEDICAL EQUIPMENT AND NO OUTSIDE SERVICES 
 
ASSISTING IN THE HOME. CM DISCUSSED AVAILABILITY OF HOME HEALTH, REHAB 
SERVICES AND MEDICAL EQUIPMENT. PT DENIES DISCHARGE NEEDS OTHER THAN A RIDE.  
HOME. THE [employer] BUS DOES NOT GO OUTSIDE [place] TO [facility] WHERE PT LIVES.  
CM OFFERED TO ASSIST PT WITH CALLING SOMEONE TO PICK HIM UP.  PT DENIES 
HAVING ANY FAMILY, FRIENDS OR ACQUAINTANCES THAT MAY ASSIST HIM TODAY.  CM 
EXPLAINED THAT INSURANCE IS NOT GOING TO PAY FOR ANOTHER NIGHT IN THE 
HOSPITAL AND EXPLAINED THAT PT IS IN AN OBSERVATION BED AND IF PT CHOSES TO 
STAY, HE MAY BE RESPONSIBLE FOR THE COSTS OF THE ADDITIONAL NIGHT IN THE 
HOSPITAL..  PT STATES HE WILL TRY TO CALL SOMEONE TO PICK HIM UP. CM EDUCATED 
PT ON THE AVAILAIBILTY OF TAXI SERVICES. CM LATER SPOKE TO BEDSIDE NURSE WHO 
INFORMED CM THAT INFORMED CM THAT PT WANTS TO CALL A TAXI.  CM CALLED [employer], OBTAINED QUOTE FOR PATIENT TO PAY $32.00.  PT STATES HE CAN PAY,
 CM PROVIDED PT THE PHONE NUMBER TO ROCHELLE BIGGSI, [phone].  BEDSIDE 
NURSE NOTIFIED.  
  
: Gerald Patterson
 DCPIA - Discharge Planning Initial Assessment
 
Updated by [email or web address]: Gerald Patterson on 19   3:00 pm
*  Is the patient Alert and Oriented?
Yes
*  How many steps to enter\exit or inside your home?
 
*  PCP
DR. ALFORD
*  Pharmacy
HOME [facility]
*  Preadmission Environment
Home Alone
*  ADLs
Independent
*  Equipment
None
*  Other Equipment
NO MEDICAL EQUIPMENT PROVIDER PREFERENCE
*  List name and contact numbers for known caregivers / representatives who 
currently or will assist patient after discharge:
NONE
*  Verbal permission to speak to the caregivers and representatives has been 
obtained from the patient.
N/A
*  Community resources currently utilized
None
*  Please name any agencies selected above.
NONE
*  Additional services required to return to the preadmission environment?
No
*  Can the patient safely return to the preadmission environment?
Yes
*  Has this patient been hospitalized within the prior 30 days at any 
hospital?
No
 
 
 
 
 
 
 
 
Last DP export: 19   2:03 pm
Patient Name: NOEMI AUGUSTIN
Encounter #: [id number]
Page 63360
 
 
 
 
 
Electronically Signed by YADIRA ROCHE on 19 at 1512
 
 
 
 
 
 
**All edits/amendments must be made on the electronic document**
 
DICTATION DATE: 19     : JARRELL  19 151     
RPT#: [id number]                                MS DATE:19
                                               STATUS: DIS IN  
[facility]
[address] [place] [address]
***END OF REPORT***

## 2019-08-08 NOTE — NUR
PT DISCHARGED HOME VIA TAXI. PT CHOSE TO WALK AND REFUSED WHEELCHAIR. PIV
REMOVED WITH CATHETER TIP FULLY INTACT. PT SIGNED PROPER DISCHARGE INSTRUCTION
AND REMOVED ALL VALUABLES FROM THE ROOM. TELEMETRY REMOVED AND RETURNED.

## 2019-08-13 ENCOUNTER — HOSPITAL ENCOUNTER (EMERGENCY)
Dept: HOSPITAL 84 - D.ER | Age: 79
LOS: 1 days | Discharge: HOME | End: 2019-08-14
Payer: MEDICARE

## 2019-08-13 VITALS
HEIGHT: 68 IN | HEIGHT: 68 IN | BODY MASS INDEX: 24.25 KG/M2 | WEIGHT: 160 LBS | BODY MASS INDEX: 24.25 KG/M2 | WEIGHT: 160 LBS

## 2019-08-13 DIAGNOSIS — M54.32: Primary | ICD-10-CM

## 2019-08-13 DIAGNOSIS — G89.29: ICD-10-CM

## 2019-08-13 DIAGNOSIS — I10: ICD-10-CM

## 2019-08-14 VITALS — SYSTOLIC BLOOD PRESSURE: 143 MMHG | DIASTOLIC BLOOD PRESSURE: 79 MMHG

## 2021-05-02 ENCOUNTER — HOSPITAL ENCOUNTER (OUTPATIENT)
Dept: HOSPITAL 84 - D.ER | Age: 81
Setting detail: OBSERVATION
LOS: 1 days | Discharge: HOME | End: 2021-05-03
Attending: FAMILY MEDICINE | Admitting: FAMILY MEDICINE
Payer: MEDICARE

## 2021-05-02 VITALS — SYSTOLIC BLOOD PRESSURE: 139 MMHG | DIASTOLIC BLOOD PRESSURE: 70 MMHG

## 2021-05-02 VITALS
HEIGHT: 68 IN | HEIGHT: 68 IN | WEIGHT: 160.34 LBS | BODY MASS INDEX: 24.3 KG/M2 | BODY MASS INDEX: 24.3 KG/M2 | WEIGHT: 160.34 LBS

## 2021-05-02 VITALS — DIASTOLIC BLOOD PRESSURE: 70 MMHG | SYSTOLIC BLOOD PRESSURE: 145 MMHG

## 2021-05-02 VITALS — DIASTOLIC BLOOD PRESSURE: 74 MMHG | SYSTOLIC BLOOD PRESSURE: 170 MMHG

## 2021-05-02 VITALS — DIASTOLIC BLOOD PRESSURE: 66 MMHG | SYSTOLIC BLOOD PRESSURE: 138 MMHG

## 2021-05-02 VITALS — SYSTOLIC BLOOD PRESSURE: 138 MMHG | DIASTOLIC BLOOD PRESSURE: 66 MMHG

## 2021-05-02 VITALS — DIASTOLIC BLOOD PRESSURE: 83 MMHG | SYSTOLIC BLOOD PRESSURE: 173 MMHG

## 2021-05-02 VITALS — DIASTOLIC BLOOD PRESSURE: 71 MMHG | SYSTOLIC BLOOD PRESSURE: 149 MMHG

## 2021-05-02 DIAGNOSIS — F41.9: ICD-10-CM

## 2021-05-02 DIAGNOSIS — R07.89: Primary | ICD-10-CM

## 2021-05-02 DIAGNOSIS — R11.0: ICD-10-CM

## 2021-05-02 DIAGNOSIS — I25.10: ICD-10-CM

## 2021-05-02 DIAGNOSIS — I10: ICD-10-CM

## 2021-05-02 LAB
ALBUMIN SERPL-MCNC: 3.8 G/DL (ref 3.4–5)
ALP SERPL-CCNC: 98 U/L (ref 30–120)
ALT SERPL-CCNC: 16 U/L (ref 10–68)
ANION GAP SERPL CALC-SCNC: 12.4 MMOL/L (ref 8–16)
APTT BLD: 28.2 SECONDS (ref 22.8–39.4)
BASOPHILS NFR BLD AUTO: 0.5 % (ref 0–2)
BILIRUB SERPL-MCNC: 0.88 MG/DL (ref 0.2–1.3)
BUN SERPL-MCNC: 24 MG/DL (ref 7–18)
CALCIUM SERPL-MCNC: 9.4 MG/DL (ref 8.5–10.1)
CHLORIDE SERPL-SCNC: 105 MMOL/L (ref 98–107)
CK MB SERPL-MCNC: 1.5 U/L (ref 0–3.6)
CK MB SERPL-MCNC: 1.6 U/L (ref 0–3.6)
CK SERPL-CCNC: 35 UL (ref 21–232)
CK SERPL-CCNC: 36 UL (ref 21–232)
CO2 SERPL-SCNC: 28.5 MMOL/L (ref 21–32)
CREAT SERPL-MCNC: 1 MG/DL (ref 0.6–1.3)
EOSINOPHIL NFR BLD: 0.4 % (ref 0–7)
ERYTHROCYTE [DISTWIDTH] IN BLOOD BY AUTOMATED COUNT: 13.3 % (ref 11.5–14.5)
GLOBULIN SER-MCNC: 3.7 G/L
GLUCOSE SERPL-MCNC: 130 MG/DL (ref 74–106)
HCT VFR BLD CALC: 41.7 % (ref 42–54)
HGB BLD-MCNC: 13.3 G/DL (ref 13.5–17.5)
IMM GRANULOCYTES NFR BLD: 0.2 % (ref 0–5)
INR PPP: 1.08 (ref 0.85–1.17)
LYMPHOCYTES # BLD: 1.54 10X3/UL (ref 1.32–3.57)
LYMPHOCYTES NFR BLD AUTO: 18 % (ref 15–50)
MAGNESIUM SERPL-MCNC: 2.4 MG/DL (ref 1.8–2.4)
MCH RBC QN AUTO: 29.3 PG (ref 26–34)
MCHC RBC AUTO-ENTMCNC: 31.9 G/DL (ref 31–37)
MCV RBC: 91.9 FL (ref 80–100)
MONOCYTES NFR BLD: 5.4 % (ref 2–11)
NEUTROPHILS # BLD: 6.48 10X3/UL (ref 1.78–5.38)
NEUTROPHILS NFR BLD AUTO: 75.5 % (ref 40–80)
OSMOLALITY SERPL CALC.SUM OF ELEC: 288 MOSM/KG (ref 275–300)
PLATELET # BLD: 303 10X3/UL (ref 130–400)
PMV BLD AUTO: 10.9 FL (ref 7.4–10.4)
POTASSIUM SERPL-SCNC: 3.9 MMOL/L (ref 3.5–5.1)
PROT SERPL-MCNC: 7.5 G/DL (ref 6.4–8.2)
PROTHROMBIN TIME: 13 SECONDS (ref 11.6–15)
RBC # BLD AUTO: 4.54 10X6/UL (ref 4.2–6.1)
SODIUM SERPL-SCNC: 142 MMOL/L (ref 136–145)
TROPONIN I SERPL-MCNC: < 0.017 NG/ML (ref 0–0.06)
TROPONIN I SERPL-MCNC: < 0.017 NG/ML (ref 0–0.06)
WBC # BLD AUTO: 8.6 10X3/UL (ref 4.8–10.8)

## 2021-05-02 NOTE — CN
PATIENT NAME:NOEMI AUGUSTIN                                  MEDICAL RECORD: [id number]
: 42                                              LOCATION:D. D.2115
ADMIT DATE: 21                                       ACCOUNT: [id number]
CONSULTING PHYSICIAN:    CÉSAR TATE MD               
                                               
REFERRING PHYSICIAN:     LISA RODRIGUEZ MD                
 
 
DATE OF CONSULTATION:  2021
 
HISTORY OF PRESENT ILLNESS:  The patient is a 80-year-old  male with
history of atherosclerotic heart disease, PCI/stents, and hypertension, who
presented via EMS with complaints of chest pain.  The patient denies PND,
orthopnea symptoms.  Asked to evaluate from cardiovascular standpoint.
 
PAST MEDICAL HISTORY:  Significant for;
1. Atherosclerotic heart disease.
2. History of PCI/stent.
3. Complaints of chest pain -- nonexertional.
4. Hypertension.
 
PHYSICAL EXAMINATION:
GENERAL:  Pleasant elderly white male lying in no apparent distress.
VITAL SIGNS:  Blood pressure 130s/60s, pulse 60s (regular).
HEENT:  Sclerae clear; conjunctivae pink.
NECK:  Supple; no appreciated JVD.
HEART:  Regular rhythm and rate.
LUNGS:  Clear bilaterally.
ABDOMEN:  Benign.
EXTREMITIES:  Negative for edema.
NEUROLOGIC:  Nonfocal.
 
DIAGNOSTIC STUDIES:  EKG, normal sinus rhythm, 73 beats per minute, no acute
ST-T wave changes.
 
MEDICATIONS:
1. Amlodipine 10 mg daily.
2. Lorazepam p.r.n.
 
LABORATORY DATA:  White blood cell count 5, hemoglobin and hematocrit 12.2 and
36.7 and platelet count is 226.  Sodium 142, potassium 3.9, BUN of 24,
creatinine 1.  Troponin 0.017 (negative).  BNP 70 (within normal limits). 
Toxicology positive for opiates and benzodiazepines.  Influenza A, B, rapid
negative.  PT/INR 13/1.0.
 
ASSESSMENT:
1. History of atherosclerotic heart disease.  History of PCI/stents.
2. Chest pain/discomfort -- nonexertional.
3. Hypertension.
 
PLAN:  Continue current medical management at this time except we will restart
aspirin 81 mg daily and Imdur 30 mg a day.  There is no evidence of acute
cardiac event/decompensation at this time.  The patient will be scheduled for
echocardiogram to assess LV function and valvular status.  Further
recommendations as clinically indicated.
 
Thank you for allowing me to participate in the care of this patient.
 
 
 
 
CONSULT REPORT                                 [id number]    NOEMI AUGUSTIN                
 
 
TRANSINT:[id number] Voice Confirmation ID: [id number] DOCUMENT ID: [id number]
                                           
                                           CÉSAR TATE MD               
 
 
 
 
 
 
 
 
 
 
 
 
 
 
 
 
 
 
 
 
 
 
 
 
 
 
 
 
 
 
 
 
 
 
 
 
 
 
 
 
 
 
 
 
CC:                                                             [id number]
DICTATION DATE: 21 1414     :     21      ADM IN  
                                                                              
[id number] [place] [address]

## 2021-05-02 NOTE — NUR
2MG OF MORPHINE GIVEN FOR PAIN LEVEL OF 7/10. PROVIDED PT WITH SANDWICH AND
SOMETHING TO DRINK. ALL NEEDS MET, CALL LIGHT IN REACH.

## 2021-05-02 NOTE — NUR
RECEIVED PT TO ROOM 2115 VIA WHEELCHAIR, PT ABLE TO AMBULATE FROM WHEELCHAIR
TO BED WITH STEADY GATE. PT A/O X4, RESP EVEN AND NONLABORED ON RA. ORIENTED
PT TO ROOM AND CALL LIGHT, WILL ASSESS PT AND START PLAN OF CARE.

## 2021-05-03 VITALS — DIASTOLIC BLOOD PRESSURE: 67 MMHG | SYSTOLIC BLOOD PRESSURE: 140 MMHG

## 2021-05-03 VITALS — DIASTOLIC BLOOD PRESSURE: 72 MMHG | SYSTOLIC BLOOD PRESSURE: 138 MMHG

## 2021-05-03 LAB
ALBUMIN SERPL-MCNC: 3.4 G/DL (ref 3.4–5)
ALP SERPL-CCNC: 88 U/L (ref 30–120)
ALT SERPL-CCNC: 15 U/L (ref 10–68)
ANION GAP SERPL CALC-SCNC: 12.9 MMOL/L (ref 8–16)
BASOPHILS NFR BLD AUTO: 0.7 % (ref 0–2)
BILIRUB SERPL-MCNC: 0.74 MG/DL (ref 0.2–1.3)
BUN SERPL-MCNC: 19 MG/DL (ref 7–18)
CALCIUM SERPL-MCNC: 8.8 MG/DL (ref 8.5–10.1)
CHLORIDE SERPL-SCNC: 105 MMOL/L (ref 98–107)
CK MB SERPL-MCNC: 1.4 U/L (ref 0–3.6)
CK MB SERPL-MCNC: 1.5 U/L (ref 0–3.6)
CK SERPL-CCNC: 44 UL (ref 21–232)
CK SERPL-CCNC: 47 UL (ref 21–232)
CO2 SERPL-SCNC: 27.2 MMOL/L (ref 21–32)
CREAT SERPL-MCNC: 0.9 MG/DL (ref 0.6–1.3)
EOSINOPHIL NFR BLD: 1.2 % (ref 0–7)
ERYTHROCYTE [DISTWIDTH] IN BLOOD BY AUTOMATED COUNT: 13.2 % (ref 11.5–14.5)
GLOBULIN SER-MCNC: 3.2 G/L
GLUCOSE SERPL-MCNC: 98 MG/DL (ref 74–106)
HCT VFR BLD CALC: 39.6 % (ref 42–54)
HGB BLD-MCNC: 12.6 G/DL (ref 13.5–17.5)
IMM GRANULOCYTES NFR BLD: 0.1 % (ref 0–5)
LYMPHOCYTES # BLD: 1.89 10X3/UL (ref 1.32–3.57)
LYMPHOCYTES NFR BLD AUTO: 27.7 % (ref 15–50)
MCH RBC QN AUTO: 29.2 PG (ref 26–34)
MCHC RBC AUTO-ENTMCNC: 31.8 G/DL (ref 31–37)
MCV RBC: 91.7 FL (ref 80–100)
MONOCYTES NFR BLD: 6.3 % (ref 2–11)
NEUTROPHILS # BLD: 4.37 10X3/UL (ref 1.78–5.38)
NEUTROPHILS NFR BLD AUTO: 64 % (ref 40–80)
OSMOLALITY SERPL CALC.SUM OF ELEC: 282 MOSM/KG (ref 275–300)
PLATELET # BLD: 285 10X3/UL (ref 130–400)
PMV BLD AUTO: 11.3 FL (ref 7.4–10.4)
POTASSIUM SERPL-SCNC: 4.1 MMOL/L (ref 3.5–5.1)
PROT SERPL-MCNC: 6.6 G/DL (ref 6.4–8.2)
RBC # BLD AUTO: 4.32 10X6/UL (ref 4.2–6.1)
SODIUM SERPL-SCNC: 141 MMOL/L (ref 136–145)
TROPONIN I SERPL-MCNC: < 0.017 NG/ML (ref 0–0.06)
TROPONIN I SERPL-MCNC: < 0.017 NG/ML (ref 0–0.06)
WBC # BLD AUTO: 6.8 10X3/UL (ref 4.8–10.8)

## 2021-05-03 NOTE — NUR
PROVIDED VERBAL AND WRITTEN DISHCARGE TEACHING TO PT, WHO VERBALIZED
UNDERSTANDING REGARDING TEACHING. D/C LT AC IV WITH CATHETER TIP INTACT. HEART
MONITOR REMOVED AND RUTHNE TO MONITOR TECH. CALLED [facility] FOR PT, [facility] WILL BE
HERE IN 15-20MIN, THEY WILL CALL WHEN THEY ARE AT THE MAIN ENTRANCE.

## 2021-05-03 NOTE — NUR
PT REFUSED A WHEELCHAIR, LEFT UNIT VIA AMBULATORY WITH ALL BELONGINGS,
ACCOMPANIED BY THIS NURSE, NAD NOTED.

## 2021-05-15 ENCOUNTER — HOSPITAL ENCOUNTER (EMERGENCY)
Dept: HOSPITAL 84 - D.ER | Age: 81
Discharge: HOME | End: 2021-05-15
Payer: MEDICARE

## 2021-05-15 VITALS
WEIGHT: 160.34 LBS | HEIGHT: 68 IN | WEIGHT: 160.34 LBS | HEIGHT: 68 IN | BODY MASS INDEX: 24.3 KG/M2 | BODY MASS INDEX: 24.3 KG/M2

## 2021-05-15 VITALS — DIASTOLIC BLOOD PRESSURE: 93 MMHG | SYSTOLIC BLOOD PRESSURE: 156 MMHG

## 2021-05-15 DIAGNOSIS — I10: ICD-10-CM

## 2021-05-15 DIAGNOSIS — M79.604: ICD-10-CM

## 2021-05-15 DIAGNOSIS — G89.29: ICD-10-CM

## 2021-05-15 DIAGNOSIS — M79.605: Primary | ICD-10-CM
